# Patient Record
Sex: FEMALE | Race: WHITE | NOT HISPANIC OR LATINO | URBAN - NONMETROPOLITAN AREA
[De-identification: names, ages, dates, MRNs, and addresses within clinical notes are randomized per-mention and may not be internally consistent; named-entity substitution may affect disease eponyms.]

---

## 2020-01-29 ENCOUNTER — IMPORTED ENCOUNTER (OUTPATIENT)
Dept: URBAN - NONMETROPOLITAN AREA CLINIC 1 | Facility: CLINIC | Age: 30
End: 2020-01-29

## 2020-01-29 ENCOUNTER — PREPPED CHART (OUTPATIENT)
Dept: URBAN - NONMETROPOLITAN AREA CLINIC 4 | Facility: CLINIC | Age: 30
End: 2020-01-29

## 2020-01-29 PROBLEM — H52.223: Noted: 2020-01-29

## 2020-01-29 PROBLEM — H52.13: Noted: 2020-01-29

## 2020-01-29 PROCEDURE — S0620 ROUTINE OPHTHALMOLOGICAL EXA: HCPCS

## 2020-01-29 NOTE — PATIENT DISCUSSION
Compound Myopic Astigmatism OU -  discussed findings w/patient-  new spectacle Rx issued-  monitor yearly or prn; 's Notes: MR 1/29/2020DFE 1/29/2020

## 2022-02-01 ASSESSMENT — VISUAL ACUITY
OU_SC: J1+
OS_SC: 20/100
OD_SC: 20/100
OD_PH: 20/30
OS_PH: 20/40
OU_SC: 20/60

## 2022-04-10 ASSESSMENT — VISUAL ACUITY
OU_SC: J1+
OD_CC: 20/100
OU_CC: 20/60
OD_PH: 20/30
OS_PH: 20/40
OS_CC: 20/100

## 2022-04-10 ASSESSMENT — TONOMETRY
OS_IOP_MMHG: 16
OD_IOP_MMHG: 16

## 2022-11-01 ENCOUNTER — ESTABLISHED PATIENT (OUTPATIENT)
Dept: RURAL CLINIC 2 | Facility: CLINIC | Age: 32
End: 2022-11-01

## 2022-11-01 DIAGNOSIS — H52.13: ICD-10-CM

## 2022-11-01 DIAGNOSIS — H52.223: ICD-10-CM

## 2022-11-01 PROCEDURE — S0621 ROUTINE OPHTHALMOLOGICAL EXA: HCPCS

## 2022-11-01 ASSESSMENT — VISUAL ACUITY
OD_CC: 20/20
OS_CC: 20/20

## 2022-11-01 ASSESSMENT — TONOMETRY
OS_IOP_MMHG: 15
OD_IOP_MMHG: 15